# Patient Record
Sex: MALE | Race: WHITE | Employment: FULL TIME | URBAN - METROPOLITAN AREA
[De-identification: names, ages, dates, MRNs, and addresses within clinical notes are randomized per-mention and may not be internally consistent; named-entity substitution may affect disease eponyms.]

---

## 2024-06-22 ENCOUNTER — APPOINTMENT (OUTPATIENT)
Dept: GENERAL RADIOLOGY | Age: 44
DRG: 395 | End: 2024-06-22

## 2024-06-22 ENCOUNTER — ANESTHESIA (OUTPATIENT)
Dept: OPERATING ROOM | Age: 44
End: 2024-06-22
Payer: COMMERCIAL

## 2024-06-22 ENCOUNTER — ANESTHESIA EVENT (OUTPATIENT)
Dept: OPERATING ROOM | Age: 44
End: 2024-06-22
Payer: COMMERCIAL

## 2024-06-22 ENCOUNTER — HOSPITAL ENCOUNTER (INPATIENT)
Age: 44
LOS: 3 days | Discharge: HOME | DRG: 395 | End: 2024-06-25
Attending: STUDENT IN AN ORGANIZED HEALTH CARE EDUCATION/TRAINING PROGRAM | Admitting: SURGERY

## 2024-06-22 DIAGNOSIS — T18.9XXA SWALLOWED FOREIGN BODY, INITIAL ENCOUNTER: Primary | ICD-10-CM

## 2024-06-22 LAB
ALBUMIN SERPL-MCNC: 4.3 G/DL (ref 3.5–5.2)
ALP SERPL-CCNC: 65 U/L (ref 40–129)
ALT SERPL-CCNC: 17 U/L (ref 0–40)
ANION GAP SERPL CALCULATED.3IONS-SCNC: 7 MMOL/L (ref 7–16)
AST SERPL-CCNC: 21 U/L (ref 0–39)
BASOPHILS # BLD: 0.04 K/UL (ref 0–0.2)
BASOPHILS NFR BLD: 1 % (ref 0–2)
BILIRUB SERPL-MCNC: 0.4 MG/DL (ref 0–1.2)
BUN SERPL-MCNC: 15 MG/DL (ref 6–20)
CALCIUM SERPL-MCNC: 9.7 MG/DL (ref 8.6–10.2)
CHLORIDE SERPL-SCNC: 102 MMOL/L (ref 98–107)
CO2 SERPL-SCNC: 27 MMOL/L (ref 22–29)
CREAT SERPL-MCNC: 1.3 MG/DL (ref 0.7–1.2)
EOSINOPHIL # BLD: 0.04 K/UL (ref 0.05–0.5)
EOSINOPHILS RELATIVE PERCENT: 1 % (ref 0–6)
ERYTHROCYTE [DISTWIDTH] IN BLOOD BY AUTOMATED COUNT: 11.9 % (ref 11.5–15)
GFR, ESTIMATED: 69 ML/MIN/1.73M2
GLUCOSE SERPL-MCNC: 100 MG/DL (ref 74–99)
HCT VFR BLD AUTO: 40.3 % (ref 37–54)
HGB BLD-MCNC: 13 G/DL (ref 12.5–16.5)
IMM GRANULOCYTES # BLD AUTO: 0.03 K/UL (ref 0–0.58)
IMM GRANULOCYTES NFR BLD: 0 % (ref 0–5)
LACTATE BLDV-SCNC: 1.2 MMOL/L (ref 0.5–2.2)
LYMPHOCYTES NFR BLD: 1.87 K/UL (ref 1.5–4)
LYMPHOCYTES RELATIVE PERCENT: 28 % (ref 20–42)
MCH RBC QN AUTO: 30.2 PG (ref 26–35)
MCHC RBC AUTO-ENTMCNC: 32.3 G/DL (ref 32–34.5)
MCV RBC AUTO: 93.7 FL (ref 80–99.9)
MONOCYTES NFR BLD: 0.61 K/UL (ref 0.1–0.95)
MONOCYTES NFR BLD: 9 % (ref 2–12)
NEUTROPHILS NFR BLD: 61 % (ref 43–80)
NEUTS SEG NFR BLD: 4.1 K/UL (ref 1.8–7.3)
PLATELET # BLD AUTO: 200 K/UL (ref 130–450)
PMV BLD AUTO: 9.5 FL (ref 7–12)
POTASSIUM SERPL-SCNC: 4.1 MMOL/L (ref 3.5–5)
PROT SERPL-MCNC: 7.1 G/DL (ref 6.4–8.3)
RBC # BLD AUTO: 4.3 M/UL (ref 3.8–5.8)
SODIUM SERPL-SCNC: 136 MMOL/L (ref 132–146)
WBC OTHER # BLD: 6.7 K/UL (ref 4.5–11.5)

## 2024-06-22 PROCEDURE — 7100000001 HC PACU RECOVERY - ADDTL 15 MIN: Performed by: SURGERY

## 2024-06-22 PROCEDURE — 3700000000 HC ANESTHESIA ATTENDED CARE: Performed by: SURGERY

## 2024-06-22 PROCEDURE — 6360000002 HC RX W HCPCS

## 2024-06-22 PROCEDURE — 71046 X-RAY EXAM CHEST 2 VIEWS: CPT

## 2024-06-22 PROCEDURE — 6370000000 HC RX 637 (ALT 250 FOR IP)

## 2024-06-22 PROCEDURE — 85025 COMPLETE CBC W/AUTO DIFF WBC: CPT

## 2024-06-22 PROCEDURE — 83605 ASSAY OF LACTIC ACID: CPT

## 2024-06-22 PROCEDURE — 7100000000 HC PACU RECOVERY - FIRST 15 MIN: Performed by: SURGERY

## 2024-06-22 PROCEDURE — 43235 EGD DIAGNOSTIC BRUSH WASH: CPT | Performed by: SURGERY

## 2024-06-22 PROCEDURE — 2580000003 HC RX 258

## 2024-06-22 PROCEDURE — 99285 EMERGENCY DEPT VISIT HI MDM: CPT

## 2024-06-22 PROCEDURE — 0DJ08ZZ INSPECTION OF UPPER INTESTINAL TRACT, VIA NATURAL OR ARTIFICIAL OPENING ENDOSCOPIC: ICD-10-PCS | Performed by: SURGERY

## 2024-06-22 PROCEDURE — 3700000001 HC ADD 15 MINUTES (ANESTHESIA): Performed by: SURGERY

## 2024-06-22 PROCEDURE — 2709999900 HC NON-CHARGEABLE SUPPLY: Performed by: SURGERY

## 2024-06-22 PROCEDURE — 2500000003 HC RX 250 WO HCPCS

## 2024-06-22 PROCEDURE — 6370000000 HC RX 637 (ALT 250 FOR IP): Performed by: STUDENT IN AN ORGANIZED HEALTH CARE EDUCATION/TRAINING PROGRAM

## 2024-06-22 PROCEDURE — 3600000012 HC SURGERY LEVEL 2 ADDTL 15MIN: Performed by: SURGERY

## 2024-06-22 PROCEDURE — 2580000003 HC RX 258: Performed by: STUDENT IN AN ORGANIZED HEALTH CARE EDUCATION/TRAINING PROGRAM

## 2024-06-22 PROCEDURE — 1200000000 HC SEMI PRIVATE

## 2024-06-22 PROCEDURE — 3600000002 HC SURGERY LEVEL 2 BASE: Performed by: SURGERY

## 2024-06-22 PROCEDURE — 93005 ELECTROCARDIOGRAM TRACING: CPT | Performed by: STUDENT IN AN ORGANIZED HEALTH CARE EDUCATION/TRAINING PROGRAM

## 2024-06-22 PROCEDURE — 80053 COMPREHEN METABOLIC PANEL: CPT

## 2024-06-22 PROCEDURE — 74018 RADEX ABDOMEN 1 VIEW: CPT

## 2024-06-22 PROCEDURE — 99223 1ST HOSP IP/OBS HIGH 75: CPT | Performed by: SURGERY

## 2024-06-22 RX ORDER — SERTRALINE HYDROCHLORIDE 100 MG/1
50 TABLET, FILM COATED ORAL DAILY
COMMUNITY
Start: 2024-05-17

## 2024-06-22 RX ORDER — ALBUTEROL SULFATE 90 UG/1
AEROSOL, METERED RESPIRATORY (INHALATION) PRN
Status: DISCONTINUED | OUTPATIENT
Start: 2024-06-22 | End: 2024-06-22 | Stop reason: SDUPTHER

## 2024-06-22 RX ORDER — MIRTAZAPINE 15 MG/1
7.5 TABLET, FILM COATED ORAL NIGHTLY
COMMUNITY

## 2024-06-22 RX ORDER — FENTANYL CITRATE 50 UG/ML
50 INJECTION, SOLUTION INTRAMUSCULAR; INTRAVENOUS EVERY 5 MIN PRN
Status: DISCONTINUED | OUTPATIENT
Start: 2024-06-22 | End: 2024-06-22 | Stop reason: HOSPADM

## 2024-06-22 RX ORDER — ONDANSETRON 2 MG/ML
INJECTION INTRAMUSCULAR; INTRAVENOUS PRN
Status: DISCONTINUED | OUTPATIENT
Start: 2024-06-22 | End: 2024-06-22 | Stop reason: SDUPTHER

## 2024-06-22 RX ORDER — SODIUM CHLORIDE 9 MG/ML
INJECTION, SOLUTION INTRAVENOUS CONTINUOUS PRN
Status: DISCONTINUED | OUTPATIENT
Start: 2024-06-22 | End: 2024-06-22 | Stop reason: SDUPTHER

## 2024-06-22 RX ORDER — ACETAMINOPHEN 325 MG/1
650 TABLET ORAL EVERY 6 HOURS PRN
Status: DISCONTINUED | OUTPATIENT
Start: 2024-06-22 | End: 2024-06-25 | Stop reason: HOSPADM

## 2024-06-22 RX ORDER — ACETAMINOPHEN 650 MG/1
650 SUPPOSITORY RECTAL EVERY 6 HOURS PRN
Status: DISCONTINUED | OUTPATIENT
Start: 2024-06-22 | End: 2024-06-25 | Stop reason: HOSPADM

## 2024-06-22 RX ORDER — PROPOFOL 10 MG/ML
INJECTION, EMULSION INTRAVENOUS PRN
Status: DISCONTINUED | OUTPATIENT
Start: 2024-06-22 | End: 2024-06-22 | Stop reason: SDUPTHER

## 2024-06-22 RX ORDER — FENTANYL CITRATE 50 UG/ML
25 INJECTION, SOLUTION INTRAMUSCULAR; INTRAVENOUS EVERY 5 MIN PRN
Status: DISCONTINUED | OUTPATIENT
Start: 2024-06-22 | End: 2024-06-22 | Stop reason: HOSPADM

## 2024-06-22 RX ORDER — SODIUM CHLORIDE 0.9 % (FLUSH) 0.9 %
5-40 SYRINGE (ML) INJECTION EVERY 12 HOURS SCHEDULED
Status: DISCONTINUED | OUTPATIENT
Start: 2024-06-22 | End: 2024-06-25 | Stop reason: HOSPADM

## 2024-06-22 RX ORDER — OMEPRAZOLE 20 MG/1
20 CAPSULE, DELAYED RELEASE ORAL DAILY
COMMUNITY

## 2024-06-22 RX ORDER — NALOXONE HYDROCHLORIDE 0.4 MG/ML
INJECTION, SOLUTION INTRAMUSCULAR; INTRAVENOUS; SUBCUTANEOUS PRN
Status: DISCONTINUED | OUTPATIENT
Start: 2024-06-22 | End: 2024-06-22 | Stop reason: HOSPADM

## 2024-06-22 RX ORDER — SODIUM CHLORIDE 9 MG/ML
INJECTION, SOLUTION INTRAVENOUS PRN
Status: DISCONTINUED | OUTPATIENT
Start: 2024-06-22 | End: 2024-06-22 | Stop reason: HOSPADM

## 2024-06-22 RX ORDER — SODIUM CHLORIDE 0.9 % (FLUSH) 0.9 %
5-40 SYRINGE (ML) INJECTION PRN
Status: DISCONTINUED | OUTPATIENT
Start: 2024-06-22 | End: 2024-06-22 | Stop reason: HOSPADM

## 2024-06-22 RX ORDER — ONDANSETRON 4 MG/1
4 TABLET, ORALLY DISINTEGRATING ORAL EVERY 8 HOURS PRN
Status: DISCONTINUED | OUTPATIENT
Start: 2024-06-22 | End: 2024-06-25 | Stop reason: HOSPADM

## 2024-06-22 RX ORDER — LIDOCAINE HYDROCHLORIDE 20 MG/ML
INJECTION, SOLUTION INTRAVENOUS PRN
Status: DISCONTINUED | OUTPATIENT
Start: 2024-06-22 | End: 2024-06-22 | Stop reason: SDUPTHER

## 2024-06-22 RX ORDER — ENOXAPARIN SODIUM 100 MG/ML
40 INJECTION SUBCUTANEOUS DAILY
Status: DISCONTINUED | OUTPATIENT
Start: 2024-06-22 | End: 2024-06-25 | Stop reason: HOSPADM

## 2024-06-22 RX ORDER — DEXAMETHASONE SODIUM PHOSPHATE 10 MG/ML
INJECTION INTRAMUSCULAR; INTRAVENOUS PRN
Status: DISCONTINUED | OUTPATIENT
Start: 2024-06-22 | End: 2024-06-22 | Stop reason: SDUPTHER

## 2024-06-22 RX ORDER — POLYETHYLENE GLYCOL 3350 17 G/17G
17 POWDER, FOR SOLUTION ORAL DAILY PRN
Status: DISCONTINUED | OUTPATIENT
Start: 2024-06-22 | End: 2024-06-25 | Stop reason: HOSPADM

## 2024-06-22 RX ORDER — ONDANSETRON 2 MG/ML
4 INJECTION INTRAMUSCULAR; INTRAVENOUS
Status: DISCONTINUED | OUTPATIENT
Start: 2024-06-22 | End: 2024-06-22 | Stop reason: HOSPADM

## 2024-06-22 RX ORDER — LACTULOSE 10 G/15ML
20 SOLUTION ORAL 3 TIMES DAILY
Status: DISCONTINUED | OUTPATIENT
Start: 2024-06-23 | End: 2024-06-25 | Stop reason: HOSPADM

## 2024-06-22 RX ORDER — SODIUM CHLORIDE 9 MG/ML
INJECTION, SOLUTION INTRAVENOUS PRN
Status: DISCONTINUED | OUTPATIENT
Start: 2024-06-22 | End: 2024-06-25 | Stop reason: HOSPADM

## 2024-06-22 RX ORDER — SODIUM CHLORIDE 0.9 % (FLUSH) 0.9 %
5-40 SYRINGE (ML) INJECTION EVERY 12 HOURS SCHEDULED
Status: DISCONTINUED | OUTPATIENT
Start: 2024-06-22 | End: 2024-06-22 | Stop reason: HOSPADM

## 2024-06-22 RX ORDER — ONDANSETRON 2 MG/ML
4 INJECTION INTRAMUSCULAR; INTRAVENOUS EVERY 6 HOURS PRN
Status: DISCONTINUED | OUTPATIENT
Start: 2024-06-22 | End: 2024-06-25 | Stop reason: HOSPADM

## 2024-06-22 RX ORDER — SUCCINYLCHOLINE/SOD CL,ISO/PF 200MG/10ML
SYRINGE (ML) INTRAVENOUS PRN
Status: DISCONTINUED | OUTPATIENT
Start: 2024-06-22 | End: 2024-06-22 | Stop reason: SDUPTHER

## 2024-06-22 RX ORDER — SODIUM CHLORIDE, SODIUM LACTATE, POTASSIUM CHLORIDE, CALCIUM CHLORIDE 600; 310; 30; 20 MG/100ML; MG/100ML; MG/100ML; MG/100ML
INJECTION, SOLUTION INTRAVENOUS CONTINUOUS
Status: DISCONTINUED | OUTPATIENT
Start: 2024-06-22 | End: 2024-06-23

## 2024-06-22 RX ORDER — FENTANYL CITRATE 50 UG/ML
INJECTION, SOLUTION INTRAMUSCULAR; INTRAVENOUS PRN
Status: DISCONTINUED | OUTPATIENT
Start: 2024-06-22 | End: 2024-06-22 | Stop reason: SDUPTHER

## 2024-06-22 RX ORDER — SODIUM CHLORIDE 0.9 % (FLUSH) 0.9 %
5-40 SYRINGE (ML) INJECTION PRN
Status: DISCONTINUED | OUTPATIENT
Start: 2024-06-22 | End: 2024-06-25 | Stop reason: HOSPADM

## 2024-06-22 RX ORDER — ROCURONIUM BROMIDE 10 MG/ML
INJECTION, SOLUTION INTRAVENOUS PRN
Status: DISCONTINUED | OUTPATIENT
Start: 2024-06-22 | End: 2024-06-22 | Stop reason: SDUPTHER

## 2024-06-22 RX ADMIN — SODIUM CHLORIDE, POTASSIUM CHLORIDE, SODIUM LACTATE AND CALCIUM CHLORIDE: 600; 310; 30; 20 INJECTION, SOLUTION INTRAVENOUS at 18:40

## 2024-06-22 RX ADMIN — Medication 120 MG: at 16:48

## 2024-06-22 RX ADMIN — SODIUM CHLORIDE: 0.9 INJECTION, SOLUTION INTRAVENOUS at 16:41

## 2024-06-22 RX ADMIN — POLYETHYLENE GLYCOL-3350 AND ELECTROLYTES 4000 ML: 236; 6.74; 5.86; 2.97; 22.74 POWDER, FOR SOLUTION ORAL at 20:01

## 2024-06-22 RX ADMIN — ALBUTEROL SULFATE 2 PUFF: 90 AEROSOL, METERED RESPIRATORY (INHALATION) at 17:04

## 2024-06-22 RX ADMIN — SUGAMMADEX 100 MG: 100 INJECTION, SOLUTION INTRAVENOUS at 17:00

## 2024-06-22 RX ADMIN — FENTANYL CITRATE 50 MCG: 50 INJECTION, SOLUTION INTRAMUSCULAR; INTRAVENOUS at 16:48

## 2024-06-22 RX ADMIN — PROPOFOL 190 MG: 10 INJECTION, EMULSION INTRAVENOUS at 16:48

## 2024-06-22 RX ADMIN — LIDOCAINE HYDROCHLORIDE 60 MG: 20 INJECTION, SOLUTION INTRAVENOUS at 16:48

## 2024-06-22 RX ADMIN — ONDANSETRON 4 MG: 2 INJECTION INTRAMUSCULAR; INTRAVENOUS at 16:51

## 2024-06-22 RX ADMIN — DEXAMETHASONE SODIUM PHOSPHATE 10 MG: 10 INJECTION INTRAMUSCULAR; INTRAVENOUS at 16:51

## 2024-06-22 RX ADMIN — ROCURONIUM BROMIDE 10 MG: 10 INJECTION, SOLUTION INTRAVENOUS at 16:48

## 2024-06-22 ASSESSMENT — LIFESTYLE VARIABLES: SMOKING_STATUS: 0

## 2024-06-22 NOTE — OP NOTE
Operative Note      Patient: Suresh Baum  YOB: 1980  MRN: [id number]    Date of Procedure: 6/22/2024    Pre-Op Diagnosis Codes:     * Foreign body in stomach, initial encounter [T18.2XXA], ingestion of 2 AAA batteries    Post-Op Diagnosis: Same; battery is no longer in stomach or worst second portion of duodenum       Procedure(s):  ESOPHAGOGASTRODUODENOSCOPY    Surgeon(s):  Ricky Jordan MD    Assistant:   Anais Sifuentes MD    Anesthesia: General    Estimated Blood Loss (mL): Minimal    Complications: None    Specimens:   * No specimens in log *    Implants:  * No implants in log *      Drains: * No LDAs found *    Findings:  Infection Present At Time Of Surgery (PATOS) (choose all levels that have infection present):  No infection present  Other Findings: No foreign bodies seen in stomach, first, or second portions of duodenum    Detailed Description of Procedure:   The patient was brought into the endoscopy suite and placed in the left lateral decubitus position.  A bite block was placed in the patients mouth.  After the initiation of LMAC anesthesia, a gastroscope was inserted into the patient's mouth and passed into the esophagus and into the stomach.   The scope was advanced through the pylorus into the first and second portion of the duodenum with no foreign bodies identified.  The scope was then withdrawn back into the stomach where it was retroflexed.  There was no hiatal hernia noted.  The scope was then straightened. The scope was then withdrawn the entire length of the esophagus, making the note of a normal esophagus without masses, ulcerations, or lesions noted.  The scope was withdrawn entirely.  The patient tolerated the procedure well and there were no complications.      The patient will be admitted, given a bowel prep, and closely monitored for the passage of the foreign bodies.     Dr. Jordan was present for the entirety of the procedure.     Electronically signed by Anais

## 2024-06-22 NOTE — ED PROVIDER NOTES
Suresh Baum is a 45-year-old male present emerged part with concern for suicidal thoughts.  Patient states that he ingested 2 AAA batteries about 1 to 1/2 hours with arrival to emergency department.  Patient is currently in [facility].  Patient states that he is having symptoms of nausea without vomiting.  Patient denies fever, chills, chest pain or shortness of breath patient denies abdominal pain    The history is provided by the patient and medical records.        Review of Systems   Constitutional:  Negative for chills, diaphoresis, fatigue and fever.   Eyes:  Negative for photophobia and visual disturbance.   Respiratory:  Negative for cough, chest tightness and shortness of breath.    Cardiovascular:  Negative for chest pain, palpitations and leg swelling.   Gastrointestinal:  Positive for abdominal pain. Negative for abdominal distention, diarrhea, nausea and vomiting.   Genitourinary:  Negative for dysuria.   Musculoskeletal:  Negative for back pain, neck pain and neck stiffness.   Skin:  Negative for pallor and rash.   Neurological:  Negative for headaches.   Psychiatric/Behavioral:  Negative for confusion.         Physical Exam  Vitals and nursing note reviewed.   Constitutional:       General: He is not in acute distress.     Appearance: Normal appearance. He is not ill-appearing.   HENT:      Head: Normocephalic and atraumatic.   Eyes:      General: No scleral icterus.     Conjunctiva/sclera: Conjunctivae normal.      Pupils: Pupils are equal, round, and reactive to light.   Cardiovascular:      Rate and Rhythm: Normal rate and regular rhythm.   Pulmonary:      Effort: Pulmonary effort is normal.      Breath sounds: Normal breath sounds.   Abdominal:      General: Bowel sounds are normal. There is no distension.      Palpations: Abdomen is soft.      Tenderness: There is abdominal tenderness. There is no guarding or rebound.   Musculoskeletal:      Cervical back: Normal range of motion and neck supple. No

## 2024-06-22 NOTE — H&P
GENERAL SURGERY  HISTORY AND PHYSICAL  6/22/2024    Chief Complaint   Patient presents with    Swallowed Foreign Body     Ate 2 batteries 1.5 hrs ago    Suicidal     Thoughts of hurting self without plan- requesting psych eval       HPI  Suresh Baum is a 45 y.o. male who presents for evaluation of reported ingestion of 2 AAA batteries.  Patient is a prisoner.  Denies any abdominal surgical history and does not have any abdominal scars.  Denies any significant past medical history other than asthma.  States he has mild abdominal pain but is nontender on exam.  KUB with evidence of foreign bodies retained within the stomach.  Denies any fevers or chills      Past Medical History:   Diagnosis Date    Asthma     Seizures (HCC)        History reviewed. No pertinent surgical history.    Prior to Admission medications    Medication Sig Start Date End Date Taking? Authorizing Provider   budesonide-formoterol (SYMBICORT) 80-4.5 MCG/ACT AERO Inhale 2 puffs into the lungs every 12 hours 1/16/21   ProviderPtee MD   albuterol sulfate HFA (PROVENTIL;VENTOLIN;PROAIR) 108 (90 Base) MCG/ACT inhaler Inhale 1 puff into the lungs every 6 hours as needed 2/8/17   Provider, MD Pete       Allergies   Allergen Reactions    Nuts [Peanut-Containing Drug Products]        History reviewed. No pertinent family history.    Social History     Tobacco Use    Smoking status: Never     Passive exposure: Never    Smokeless tobacco: Never   Vaping Use    Vaping Use: Never used   Substance Use Topics    Alcohol use: Not Currently    Drug use: Never         Review of Systems:    Review of Systems   Constitutional:  Negative for appetite change, chills, fatigue and fever.   HENT:  Negative for facial swelling and trouble swallowing.    Respiratory:  Negative for cough and shortness of breath.    Cardiovascular:  Negative for chest pain and palpitations.   Gastrointestinal:  Positive for abdominal pain. Negative for abdominal distention,

## 2024-06-22 NOTE — ANESTHESIA PRE PROCEDURE
Department of Anesthesiology  Preprocedure Note       Name:  Suresh Baum   Age:  43 y.o.  :  1980                                          MRN:  [id number]         Date:  2024      Surgeon: Surgeon(s):  Ricky Jordan MD    Procedure: Procedure(s):  ESOPHAGOGASTRODUODENOSCOPY FOR FOREIGN BODY REMOVAL    Medications prior to admission:   Prior to Admission medications    Medication Sig Start Date End Date Taking? Authorizing Provider   budesonide-formoterol (SYMBICORT) 80-4.5 MCG/ACT AERO Inhale 2 puffs into the lungs every 12 hours 21   ProviderPete MD   albuterol sulfate HFA (PROVENTIL;VENTOLIN;PROAIR) 108 (90 Base) MCG/ACT inhaler Inhale 1 puff into the lungs every 6 hours as needed 17   ProviderPete MD       Current medications:    Current Facility-Administered Medications   Medication Dose Route Frequency Provider Last Rate Last Admin    lactated ringers IV soln infusion   IntraVENous Continuous Anais Sifuentes MD         Current Outpatient Medications   Medication Sig Dispense Refill    budesonide-formoterol (SYMBICORT) 80-4.5 MCG/ACT AERO Inhale 2 puffs into the lungs every 12 hours      albuterol sulfate HFA (PROVENTIL;VENTOLIN;PROAIR) 108 (90 Base) MCG/ACT inhaler Inhale 1 puff into the lungs every 6 hours as needed         Allergies:    Allergies   Allergen Reactions    Nuts [Peanut-Containing Drug Products]        Problem List:  There is no problem list on file for this patient.      Past Medical History:        Diagnosis Date    Asthma     Seizures (HCC)        Past Surgical History:  History reviewed. No pertinent surgical history.    Social History:    Social History     Tobacco Use    Smoking status: Never     Passive exposure: Never    Smokeless tobacco: Never   Substance Use Topics    Alcohol use: Not Currently                                Counseling given: Not Answered      Vital Signs (Current):   Vitals:    24 1048 24 1426 24 1429

## 2024-06-22 NOTE — ANESTHESIA POSTPROCEDURE EVALUATION
Department of Anesthesiology  Postprocedure Note    Patient: Suresh Baum  MRN: [id number]  YOB: 1980  Date of evaluation: 6/22/2024    Procedure Summary       Date: 06/22/24 Room / Location: [address]    Anesthesia Start: 1642 Anesthesia Stop: 1714    Procedure: ESOPHAGOGASTRODUODENOSCOPY (Upper GI Region) Diagnosis:       Foreign body in stomach, initial encounter      (Foreign body in stomach, initial encounter [T18.2XXA])    Surgeons: Ricky Jordan MD Responsible Provider: Franko Prince DO    Anesthesia Type: General ASA Status: 3 - Emergent            Anesthesia Type: General    Monster Phase I: Monster Score: 10    Monster Phase II:      Anesthesia Post Evaluation    No notable events documented.

## 2024-06-23 ENCOUNTER — APPOINTMENT (OUTPATIENT)
Dept: GENERAL RADIOLOGY | Age: 44
DRG: 395 | End: 2024-06-23

## 2024-06-23 LAB — GLUCOSE BLD-MCNC: 125 MG/DL (ref 74–99)

## 2024-06-23 PROCEDURE — 82962 GLUCOSE BLOOD TEST: CPT

## 2024-06-23 PROCEDURE — 6370000000 HC RX 637 (ALT 250 FOR IP): Performed by: STUDENT IN AN ORGANIZED HEALTH CARE EDUCATION/TRAINING PROGRAM

## 2024-06-23 PROCEDURE — 1200000000 HC SEMI PRIVATE

## 2024-06-23 PROCEDURE — 99232 SBSQ HOSP IP/OBS MODERATE 35: CPT | Performed by: SURGERY

## 2024-06-23 PROCEDURE — 6360000002 HC RX W HCPCS: Performed by: STUDENT IN AN ORGANIZED HEALTH CARE EDUCATION/TRAINING PROGRAM

## 2024-06-23 PROCEDURE — 74018 RADEX ABDOMEN 1 VIEW: CPT

## 2024-06-23 RX ADMIN — LACTULOSE 20 G: 20 SOLUTION ORAL at 10:09

## 2024-06-23 RX ADMIN — LACTULOSE 20 G: 20 SOLUTION ORAL at 22:12

## 2024-06-23 RX ADMIN — ENOXAPARIN SODIUM 40 MG: 100 INJECTION SUBCUTANEOUS at 10:09

## 2024-06-23 NOTE — FLOWSHEET NOTE
PT yelling and had grabbed the IV pole in room.  Said ge wants to leave AMA.  [facility] policy notified to come assist as pt very agitated.

## 2024-06-23 NOTE — PLAN OF CARE
Problem: Discharge Planning  Goal: Discharge to home or other facility with appropriate resources  6/22/2024 2353 by Jamilah Packer, RN  Outcome: Progressing  6/22/2024 1857 by Gerri Bautista RN  Outcome: Progressing     Problem: Pain  Goal: Able to cope with pain  Description: Able to cope with pain  6/22/2024 2353 by Jamilah Packer, RN  Outcome: Progressing  6/22/2024 1857 by Gerri Bautista, RN  Outcome: Progressing

## 2024-06-23 NOTE — SIGNIFICANT EVENT
I visited the patient to address his concerns regarding CLD. I discussed that a part of the treatment outlined includes Golytely, CLD and abdominal films to assess passage of the batteries that he swallowed. He was adamant that he would refuse treatment and explained that is ok. He may refuse the Golytely. The batteries just may take longer to pass through his GI tract. Patient became irate and was able to reach a table with his uncuffed hand and he shoved it over into the wall and started yelling vulgarities.     Dr. Espinosa PGY2

## 2024-06-24 ENCOUNTER — APPOINTMENT (OUTPATIENT)
Dept: GENERAL RADIOLOGY | Age: 44
DRG: 395 | End: 2024-06-24

## 2024-06-24 LAB
EKG ATRIAL RATE: 84 BPM
EKG P AXIS: 58 DEGREES
EKG P-R INTERVAL: 200 MS
EKG Q-T INTERVAL: 352 MS
EKG QRS DURATION: 84 MS
EKG QTC CALCULATION (BAZETT): 415 MS
EKG R AXIS: -23 DEGREES
EKG T AXIS: 25 DEGREES
EKG VENTRICULAR RATE: 84 BPM

## 2024-06-24 PROCEDURE — 6370000000 HC RX 637 (ALT 250 FOR IP): Performed by: STUDENT IN AN ORGANIZED HEALTH CARE EDUCATION/TRAINING PROGRAM

## 2024-06-24 PROCEDURE — 93010 ELECTROCARDIOGRAM REPORT: CPT | Performed by: INTERNAL MEDICINE

## 2024-06-24 PROCEDURE — 6370000000 HC RX 637 (ALT 250 FOR IP): Performed by: NURSE PRACTITIONER

## 2024-06-24 PROCEDURE — 1200000000 HC SEMI PRIVATE

## 2024-06-24 PROCEDURE — 74018 RADEX ABDOMEN 1 VIEW: CPT

## 2024-06-24 PROCEDURE — 99232 SBSQ HOSP IP/OBS MODERATE 35: CPT | Performed by: STUDENT IN AN ORGANIZED HEALTH CARE EDUCATION/TRAINING PROGRAM

## 2024-06-24 PROCEDURE — 6360000002 HC RX W HCPCS: Performed by: STUDENT IN AN ORGANIZED HEALTH CARE EDUCATION/TRAINING PROGRAM

## 2024-06-24 RX ORDER — DIVALPROEX SODIUM 250 MG/1
250 TABLET, DELAYED RELEASE ORAL EVERY 12 HOURS SCHEDULED
Status: DISCONTINUED | OUTPATIENT
Start: 2024-06-24 | End: 2024-06-25 | Stop reason: HOSPADM

## 2024-06-24 RX ORDER — OLANZAPINE 5 MG/1
5 TABLET ORAL NIGHTLY
Status: DISCONTINUED | OUTPATIENT
Start: 2024-06-24 | End: 2024-06-25 | Stop reason: HOSPADM

## 2024-06-24 RX ADMIN — OLANZAPINE 5 MG: 5 TABLET, FILM COATED ORAL at 21:55

## 2024-06-24 RX ADMIN — LACTULOSE 20 G: 20 SOLUTION ORAL at 08:24

## 2024-06-24 RX ADMIN — LACTULOSE 20 G: 20 SOLUTION ORAL at 13:48

## 2024-06-24 RX ADMIN — ENOXAPARIN SODIUM 40 MG: 100 INJECTION SUBCUTANEOUS at 08:24

## 2024-06-24 RX ADMIN — LACTULOSE 20 G: 20 SOLUTION ORAL at 21:54

## 2024-06-24 RX ADMIN — DIVALPROEX SODIUM 250 MG: 250 TABLET, DELAYED RELEASE ORAL at 21:54

## 2024-06-24 NOTE — CONSULTS
lungs are without acute focal process.  There is no effusion or pneumothorax. The cardiomediastinal silhouette is without acute process. The osseous structures are without acute process. Images of the upper abdomen demonstrate 2 batteries within the stomach.     1. The lungs are clear. 2. There are 2 batteries noted within the stomach consistent with the history of ingestion of 2 AAA batteries       EKG: TRACING REVIEWED    RECOMMENDATIONS  The patient's diagnosis, treatment plan, medication management were formulated after patient was seen directly by the attending physician and myself and all relevant documentation was reviewed.    Patient will return to the nursing home upon medical clearance.   He may benefit from mental health services provided at the facility.  Recommend Zyprexa 5 mg nightly to help with impulsivity and sleep   Recommend Depakote 250 mg BID to augment the zyprexa and reduce irritability     NOTE: This report was transcribed using voice recognition software. Every effort was made to ensure accuracy; however, inadvertent computerized transcription errors may be present.    Electronically signed by MARIA EUGENIA Grace CNP on 6/24/2024 at 1:05 PM

## 2024-06-24 NOTE — PLAN OF CARE
Problem: Discharge Planning  Goal: Discharge to home or other facility with appropriate resources  6/23/2024 2347 by Tamar Retana, RN  Outcome: Progressing  Flowsheets (Taken 6/23/2024 2145)  Discharge to home or other facility with appropriate resources: Identify barriers to discharge with patient and caregiver  6/23/2024 1213 by Gerri Bautista, RN  Outcome: Progressing     Problem: Pain  Goal: Able to cope with pain  Description: Able to cope with pain  6/23/2024 2347 by Tamar Retana, RN  Outcome: Progressing  6/23/2024 1213 by Gerri Bautista, RN  Outcome: Progressing

## 2024-06-24 NOTE — DISCHARGE SUMMARY
ingested foreign bodies FINDINGS: Seen with the abdomen reveals interval progression in advancement of the 2 batteries now projecting over the right lower quadrant possibly within the cecum or distal small bowel.  No obvious obstruction.  Stool seen scattered throughout the colon.     Interval progression in advancement of the 2 batteries now projecting over the right lower quadrant possibly within the cecum or distal small bowel. Continued follow-up is recommended.     XR ABDOMEN (KUB) (SINGLE AP VIEW)    Result Date: 6/22/2024  EXAMINATION: ONE SUPINE XRAY VIEW(S) OF THE ABDOMEN 6/22/2024 1:16 pm COMPARISON: None. HISTORY: ORDERING SYSTEM PROVIDED HISTORY: Patient states he ingested AAA batteries TECHNOLOGIST PROVIDED HISTORY: Reason for exam:->Patient states he ingested AAA batteries FINDINGS: KUB reveals 2 batteries identified within the stomach.  Nonobstructive bowel gas pattern.  Some stool scattered throughout the colon.  Bony structures are unremarkable.     2 batteries identified within the stomach.  Nonobstructive bowel gas pattern. Continued follow-up recommended as indicated.     XR CHEST (2 VW)    Result Date: 6/22/2024  EXAMINATION: TWO XRAY VIEWS OF THE CHEST 6/22/2024 1:16 pm COMPARISON: None. HISTORY: ORDERING SYSTEM PROVIDED HISTORY: Foreign body patient states that he ingested AAA batteries TECHNOLOGIST PROVIDED HISTORY: Reason for exam:->Foreign body patient states that he ingested AAA batteries FINDINGS: The lungs are without acute focal process.  There is no effusion or pneumothorax. The cardiomediastinal silhouette is without acute process. The osseous structures are without acute process. Images of the upper abdomen demonstrate 2 batteries within the stomach.     1. The lungs are clear. 2. There are 2 batteries noted within the stomach consistent with the history of ingestion of 2 AAA batteries       Discharge Exam:  Physical Exam  Constitutional:       General: He is not in acute distress.

## 2024-06-24 NOTE — PLAN OF CARE
Problem: Discharge Planning  Goal: Discharge to home or other facility with appropriate resources  6/24/2024 0912 by Uday Zurita RN  Outcome: Progressing  6/23/2024 2347 by Tamar Retana RN  Outcome: Progressing  Flowsheets (Taken 6/23/2024 2145)  Discharge to home or other facility with appropriate resources: Identify barriers to discharge with patient and caregiver     Problem: Pain  Goal: Able to cope with pain  Description: Able to cope with pain  6/24/2024 0912 by Uday Zurita RN  Outcome: Progressing  6/23/2024 2347 by Tamar Retana, RN  Outcome: Progressing

## 2024-06-25 ENCOUNTER — APPOINTMENT (OUTPATIENT)
Dept: GENERAL RADIOLOGY | Age: 44
DRG: 395 | End: 2024-06-25

## 2024-06-25 VITALS
HEIGHT: 68 IN | WEIGHT: 185 LBS | RESPIRATION RATE: 16 BRPM | HEART RATE: 65 BPM | OXYGEN SATURATION: 100 % | DIASTOLIC BLOOD PRESSURE: 80 MMHG | SYSTOLIC BLOOD PRESSURE: 121 MMHG | TEMPERATURE: 97 F | BODY MASS INDEX: 28.04 KG/M2

## 2024-06-25 PROCEDURE — 6370000000 HC RX 637 (ALT 250 FOR IP): Performed by: STUDENT IN AN ORGANIZED HEALTH CARE EDUCATION/TRAINING PROGRAM

## 2024-06-25 PROCEDURE — 99232 SBSQ HOSP IP/OBS MODERATE 35: CPT | Performed by: STUDENT IN AN ORGANIZED HEALTH CARE EDUCATION/TRAINING PROGRAM

## 2024-06-25 PROCEDURE — 74018 RADEX ABDOMEN 1 VIEW: CPT

## 2024-06-25 PROCEDURE — 6360000002 HC RX W HCPCS: Performed by: STUDENT IN AN ORGANIZED HEALTH CARE EDUCATION/TRAINING PROGRAM

## 2024-06-25 PROCEDURE — 6370000000 HC RX 637 (ALT 250 FOR IP): Performed by: NURSE PRACTITIONER

## 2024-06-25 RX ORDER — DIVALPROEX SODIUM 250 MG/1
250 TABLET, DELAYED RELEASE ORAL 2 TIMES DAILY
Qty: 90 TABLET | Refills: 3 | Status: SHIPPED | OUTPATIENT
Start: 2024-06-25

## 2024-06-25 RX ORDER — OLANZAPINE 5 MG/1
5 TABLET ORAL NIGHTLY
Qty: 30 TABLET | Refills: 3 | Status: SHIPPED | OUTPATIENT
Start: 2024-06-25

## 2024-06-25 RX ADMIN — LACTULOSE 20 G: 20 SOLUTION ORAL at 10:39

## 2024-06-25 RX ADMIN — DIVALPROEX SODIUM 250 MG: 250 TABLET, DELAYED RELEASE ORAL at 10:39

## 2024-06-25 RX ADMIN — LACTULOSE 20 G: 20 SOLUTION ORAL at 13:38

## 2024-06-25 NOTE — PLAN OF CARE
Problem: Discharge Planning  Goal: Discharge to home or other facility with appropriate resources  6/25/2024 1104 by Uday Zurita, RN  Outcome: Progressing  6/24/2024 2352 by Ciro Resendez, RN  Outcome: Progressing  Flowsheets (Taken 6/24/2024 2000)  Discharge to home or other facility with appropriate resources: Identify barriers to discharge with patient and caregiver     Problem: Pain  Goal: Able to cope with pain  Description: Able to cope with pain  6/25/2024 1104 by Uday Zurita, RN  Outcome: Progressing  6/24/2024 2352 by Ciro Resendez, RN  Outcome: Progressing

## 2024-06-25 NOTE — PLAN OF CARE
Problem: Discharge Planning  Goal: Discharge to home or other facility with appropriate resources  Outcome: Progressing  Flowsheets (Taken 6/24/2024 2000)  Discharge to home or other facility with appropriate resources: Identify barriers to discharge with patient and caregiver     Problem: Pain  Goal: Able to cope with pain  Description: Able to cope with pain  Outcome: Progressing

## 2024-06-25 NOTE — CARE COORDINATION
Care Coordination  The patient was admitted from [facility] after he swallowed 2 batteries. He was taken for an egd yesterday and the batteries had already passed the stomach. He is getting chonulac. His plan is to return to the [facility]. Called [facility] @ [phone] ext [phone] and spoke to the [facility] and they are aware the patient will be discharging back there today. He is tolerating a general diet.

## 2024-06-25 NOTE — PLAN OF CARE
Problem: Discharge Planning  Goal: Discharge to home or other facility with appropriate resources  6/25/2024 1716 by Uday Zurita RN  Outcome: Completed  6/25/2024 1716 by Uday Zurita RN  Outcome: Progressing  6/25/2024 1104 by Uday Zurita RN  Outcome: Progressing     Problem: Pain  Goal: Able to cope with pain  Description: Able to cope with pain  6/25/2024 1716 by Uday Zurita RN  Outcome: Completed  6/25/2024 1716 by Uday Zurita RN  Outcome: Progressing  6/25/2024 1104 by Uday Zurita RN  Outcome: Progressing

## 2024-06-25 NOTE — PROGRESS NOTES
4 Eyes Skin Assessment     NAME:  Suresh Baum  YOB: 1980  MEDICAL RECORD NUMBER:  [id number]    The patient is being assessed for  Admission    I agree that at least one RN has performed a thorough Head to Toe Skin Assessment on the patient. ALL assessment sites listed below have been assessed.      Areas assessed by both nurses:    Head, Face, Ears, Shoulders, Back, Chest, Arms, Elbows, Hands, Sacrum. Buttock, Coccyx, Ischium, and Legs. Feet and Heels  Scar LLE          Does the Patient have a Wound? No noted wound(s)       Sushant Prevention initiated by RN: No  Wound Care Orders initiated by RN: No    Pressure Injury (Stage 3,4, Unstageable, DTI, NWPT, and Complex wounds) if present, place Wound referral order by RN under : No    New Ostomies, if present place, Ostomy referral order under : No     Nurse 1 eSignature: Electronically signed by Gerri Bautista RN on 6/22/24 at 6:55 PM EDT    **SHARE this note so that the co-signing nurse can place an eSignature**    Nurse 2 eSignature: Electronically signed by Mayra Acosta RN on 6/22/24 at 6:56 PM EDT  
Dr. Espinosa perfect served regarding patient wanting to speak with someone. Patient states he is ready to disrupt this hospital if a doctor don't come and speak with him directly.  He states he wants to leave AMA and wants to know why he can't eat regular food, after being educated multiples without effect.  Dr. Espinosa stated he will be up to talk to patient when he gets a chance.  This was communicated with patient.  
Dr. Francisca shay served in regards to patient stating he wants to leave AMA. Patient states he already has life plus 20 and he is going to become irate soon, he does not care if the batteries blow up in his stomach or not.  This writer explained to patient that if he drinks the go lytely he will have results.  
GENERAL SURGERY  DAILY PROGRESS NOTE  6/23/2024  Chief Complaint   Patient presents with    Swallowed Foreign Body     Ate 2 batteries 1.5 hrs ago    Suicidal     Thoughts of hurting self without plan- requesting psych eval         Subjective:  Patient extremely agitated, reports he wants to leave and is refusing current care.  Patient violent overnight, throwing tables and punching holes in the wall.  Patient states that he would like to speak with administration regarding his bad experience here, reports that the nurses are antagonistic.  Additionally patient states that he wants to eat and became violent and had police sent to the room secondary to concern for patient and staff safety.    I/O last 3 completed shifts:  In: 500 [I.V.:500]  Out: -   No intake/output data recorded.      Objective:  /88   Pulse 67   Temp 97.6 °F (36.4 °C) (Temporal)   Resp 16   Ht 1.73 m (5' 8.11\")   Wt 83.9 kg (185 lb)   SpO2 99%   BMI 28.04 kg/m²     GENERAL: Agitated, no acute distress, appears comfortable  Patient refused further physical exam    Lab Results   Component Value Date    WBC 6.7 06/22/2024    HGB 13.0 06/22/2024    HCT 40.3 06/22/2024    MCV 93.7 06/22/2024     06/22/2024     Lab Results   Component Value Date     06/22/2024    K 4.1 06/22/2024     06/22/2024    CO2 27 06/22/2024    BUN 15 06/22/2024    CREATININE 1.3 (H) 06/22/2024    GLUCOSE 100 (H) 06/22/2024    CALCIUM 9.7 06/22/2024    BILITOT 0.4 06/22/2024    ALKPHOS 65 06/22/2024    AST 21 06/22/2024    ALT 17 06/22/2024    LABGLOM 69 06/22/2024         Assessment/Plan:  43 y.o. male with intentional ingestion of foreign bodies; status post EGD with unsuccessful removal of batteries 6/22    -Okay for regular diet  -KUB this a.m. shows that batteries appear to be in the distal small bowel or first portion of the colon  -Patient currently with no abdominal complaints  -Will again order KUB to evaluate for progression; timed for 3 
GENERAL SURGERY  DAILY PROGRESS NOTE  6/25/2024  Chief Complaint   Patient presents with    Swallowed Foreign Body     Ate 2 batteries 1.5 hrs ago    Suicidal     Thoughts of hurting self without plan- requesting psych eval         Subjective:  Doing well. Tolerating diet and having bowel function. KUB pending       Objective:  /77   Pulse 64   Temp 97.5 °F (36.4 °C) (Temporal)   Resp 18   Ht 1.73 m (5' 8.11\")   Wt 83.9 kg (185 lb)   SpO2 97%   BMI 28.04 kg/m²     Physical Exam  Constitutional:       General: He is not in acute distress.     Appearance: Normal appearance.   HENT:      Head: Normocephalic and atraumatic.      Nose: Nose normal.      Mouth/Throat:      Mouth: Mucous membranes are moist.      Pharynx: Oropharynx is clear.   Eyes:      Extraocular Movements: Extraocular movements intact.      Pupils: Pupils are equal, round, and reactive to light.   Cardiovascular:      Rate and Rhythm: Normal rate.   Pulmonary:      Effort: Pulmonary effort is normal. No respiratory distress.   Abdominal:      General: Abdomen is flat. There is no distension.      Palpations: Abdomen is soft.      Tenderness: There is no abdominal tenderness.   Musculoskeletal:         General: Normal range of motion.      Cervical back: Normal range of motion.   Skin:     General: Skin is warm and dry.      Capillary Refill: Capillary refill takes less than 2 seconds.   Neurological:      General: No focal deficit present.      Mental Status: He is alert and oriented to person, place, and time.   Psychiatric:         Mood and Affect: Mood normal.          Lab Results   Component Value Date    WBC 6.7 06/22/2024    HGB 13.0 06/22/2024    HCT 40.3 06/22/2024    MCV 93.7 06/22/2024     06/22/2024     Lab Results   Component Value Date     06/22/2024    K 4.1 06/22/2024     06/22/2024    CO2 27 06/22/2024    BUN 15 06/22/2024    CREATININE 1.3 (H) 06/22/2024    GLUCOSE 100 (H) 06/22/2024    CALCIUM 9.7 
Patient  informed of clear liquid diet status and the need for GoLYTELY. He is demanding that he be given solid food to eat. Educated on the need for clear liquids, Patient offered clear liquids, but refuses anything. Threatening to leave AMA. Dr. Espinosa notified via perfect serve.   
Patient being uncooperative, agitated, yelling. Refused any patient care including vital signs.  
Patient is requesting grievance form to file against hospital staff.  [facility]  
Patient ripped out iv and has a piece of IV in his hand and will not hand it over.  Patient states he will eat the IV if he can't have food.  This writer educated patient on the  Order of clear liquid diet until foreign bodies are expelled.  Patient also broke arm off of glasses and planned to use as a weapon.  Patient still states that he is having SI and auditory hallucinations. Guards at bedside watching tv.   
Patient states he is having suicidal ideations with auditory hallucinations.  [facility] notified via perfect serve.  
Patient to [facility].  Appropriate monitors placed on patient.  Cart locked and in lowest position with side rails up.  Call light within reach. Pt verified using 2 Identifiers and ID band with OR staff prior to acceptance to PACU care.   
Tessa Davila notified via perfect serve of new consult for suicidal ideations.  
9.7 06/22/2024 11:27 AM    BILITOT 0.4 06/22/2024 11:27 AM    ALKPHOS 65 06/22/2024 11:27 AM    AST 21 06/22/2024 11:27 AM    ALT 17 06/22/2024 11:27 AM       Imaging: KUB reviewed and batteries appear to have transited to the left colon.  No evidence of free air     Rikki Rojas MD   Trauma/Critical care

## 2024-07-22 NOTE — CARE COORDINATION
Care Coordination    The patient was admitted the [facility] after swallowing  2 AAA batteries. 6/22 The patient went for an egd to see if the batteries where moving through the Gi system and there where not in the stomach anymore. He is getting chronulac. His plan is to return to [facility] once stable for discharge.    [FreeTextEntry1] : Gen: Patient is A&O x 3, NAD HEENT: EOMI, hearing grossly normal Resp: regular, non - labored CV: pulses regular Skin: no rashes, erythema Lymph: +Cervical edema  Inspection: +Right scapula winging  ROM: full throughout Palpation:no tenderness to palpation Sensation: intact to light touch Reflexes: 1+ and symmetric throughout Strength: 5/5 throughout Gait: normal, non-antalgic

## (undated) DEVICE — BITEBLOCK 54FR W/ DENT RIM BLOX

## (undated) DEVICE — DEFENDO AIR WATER SUCTION AND BIOPSY VALVE KIT FOR  OLYMPUS: Brand: DEFENDO AIR/WATER/SUCTION AND BIOPSY VALVE

## (undated) DEVICE — GAUZE,SPONGE,4"X4",8PLY,STRL,LF,10/TRAY: Brand: MEDLINE

## (undated) DEVICE — CONNECTOR IRRIGATION AUXILIARY H2O JET W/ PRT MTL THRD HYDR